# Patient Record
Sex: MALE | Race: WHITE | Employment: UNEMPLOYED | URBAN - METROPOLITAN AREA
[De-identification: names, ages, dates, MRNs, and addresses within clinical notes are randomized per-mention and may not be internally consistent; named-entity substitution may affect disease eponyms.]

---

## 2019-06-15 ENCOUNTER — APPOINTMENT (OUTPATIENT)
Dept: GENERAL RADIOLOGY | Age: 64
End: 2019-06-15

## 2019-06-15 ENCOUNTER — HOSPITAL ENCOUNTER (EMERGENCY)
Age: 64
Discharge: HOME | End: 2019-06-15
Attending: EMERGENCY MEDICINE

## 2019-06-15 VITALS
WEIGHT: 124.1 LBS | DIASTOLIC BLOOD PRESSURE: 81 MMHG | BODY MASS INDEX: 19.94 KG/M2 | SYSTOLIC BLOOD PRESSURE: 145 MMHG | TEMPERATURE: 98.5 F | HEART RATE: 64 BPM | RESPIRATION RATE: 16 BRPM | HEIGHT: 66 IN | OXYGEN SATURATION: 99 %

## 2019-06-15 DIAGNOSIS — S92.001A CLOSED NONDISPLACED FRACTURE OF RIGHT CALCANEUS, UNSPECIFIED PORTION OF CALCANEUS, INITIAL ENCOUNTER: Primary | ICD-10-CM

## 2019-06-15 PROCEDURE — 99283 EMERGENCY DEPT VISIT LOW MDM: CPT

## 2019-06-15 PROCEDURE — 4500000023 HC ED LEVEL 3 PROCEDURE

## 2019-06-15 PROCEDURE — 73610 X-RAY EXAM OF ANKLE: CPT

## 2019-06-15 PROCEDURE — 73630 X-RAY EXAM OF FOOT: CPT

## 2019-06-15 ASSESSMENT — PAIN DESCRIPTION - PAIN TYPE: TYPE: ACUTE PAIN

## 2019-06-15 ASSESSMENT — PAIN DESCRIPTION - LOCATION: LOCATION: ANKLE;FOOT

## 2019-06-15 ASSESSMENT — PAIN DESCRIPTION - DESCRIPTORS: DESCRIPTORS: DISCOMFORT

## 2019-06-15 ASSESSMENT — PAIN DESCRIPTION - ORIENTATION: ORIENTATION: RIGHT

## 2019-06-15 NOTE — ED PROVIDER NOTES
EMERGENCY DEPARTMENT PHYSICIAN DOCUMENTATION      CHIEF COMPLAINT  Ankle Pain (reports jump out of tree yesterday causing right ankle/foot pain with pressure )    Patient information was obtained from patient. History/Exam limitations: none. HISTORY OF PRESENT ILLNESS  Ava Urias is a 62 y.o. male with complaint of Ankle Pain (reports jump out of tree yesterday causing right ankle/foot pain with pressure )  . Injury occurred yesterday. Mechanism: slip/inversion fall 6 feet from tree, landed on feet. No back pain  R proximal foot pain  Pain is throbbing, sharp, worse with touching and using. No radiation. No fevers or chills. REVIEW OF SYSTEMS  A full 10 point Review of Systems was performed and is negative aside from pertinent positives mentioned in HPI    ALLERGIES:  Allergies   Allergen Reactions    Pcn [Penicillins]        PAST HISTORY  History reviewed. No pertinent past medical history. History reviewed. No pertinent family history. No current facility-administered medications on file prior to encounter. No current outpatient medications on file prior to encounter. Social History     Tobacco Use    Smoking status: Current Every Day Smoker     Packs/day: 1.50    Smokeless tobacco: Never Used   Substance Use Topics    Alcohol use:  Yes     Alcohol/week: 1.2 oz     Types: 2 Cans of beer per week     Comment: daily     Drug use: Never         EXAM:   Presentation Vital Signs: BP (!) 145/81   Pulse 64   Temp 98.5 °F (36.9 °C) (Oral)   Resp 16   Ht 5' 6\" (1.676 m)   Wt 56.3 kg (124 lb 1.6 oz)   SpO2 99%   BMI 20.03 kg/m²   General: Well nourished, no acute distress  Head: No traumatic injury  ENT: MMM, no facial asymmetry, no nasal discharge  Eyes: EOM  Neck: No tracheal deviation or stridor  Skin: no pallor, erythema, lesions or other abnormalities on exposed skin of face and arms  Back: No lumbar thoracic or cspine pain  Extremities: R proximal/hindfoot there is echymoses medially and laterally with STS. No maria eugenia ankle bony tenderness  Neurologic: Alert, oriented x 3. No focal deficits upon moving arms and legs  Psychiatric: Appropriate demeanor without agitation or internal stimulation      MEDICAL DECISION MAKING  Pt here with isolated R proximal injury. No evidence of significant neurovascular injury. He denies any other injury following his 6 ft fall, specifically no head pain or trauma, no back pain, and no n/t/w. Xr: There is vertically oriented fracture involving the calcaneus extending into the subtalar joint. Metacarpals are intact. Pt splinted with posterior short foot splint, crutches, non-weightbearing, fu with ortho or podiatry. Images reviewed, patient counseled, and knows to follow up with orthopedics in 1 week or less, especially if symptoms persist, and to follow up sooner if they worsen. DISPOSITION  Home    IMPRESSION:  R foot calcaneus fracture      This medical chart used with aid of transcription software. As such, there may be inadvertent errors in transcription of spellings and words despite physician's attempts to correct all possible errors.             Brunilda Akhtar MD  06/15/19 [id number]